# Patient Record
Sex: MALE | Race: OTHER
[De-identification: names, ages, dates, MRNs, and addresses within clinical notes are randomized per-mention and may not be internally consistent; named-entity substitution may affect disease eponyms.]

---

## 2017-08-08 ENCOUNTER — HOSPITAL ENCOUNTER (OUTPATIENT)
Dept: HOSPITAL 72 - PAN | Age: 77
Discharge: HOME | End: 2017-08-08
Payer: MEDICARE

## 2017-08-08 VITALS — SYSTOLIC BLOOD PRESSURE: 112 MMHG | DIASTOLIC BLOOD PRESSURE: 58 MMHG

## 2017-08-08 DIAGNOSIS — K63.5: ICD-10-CM

## 2017-08-08 DIAGNOSIS — K21.9: ICD-10-CM

## 2017-08-08 DIAGNOSIS — K59.00: ICD-10-CM

## 2017-08-08 DIAGNOSIS — K29.70: Primary | ICD-10-CM

## 2017-08-08 NOTE — GI PROGRESS NOTE
Assessment/Plan


Problems:  


(1) Gastritis


ICD Codes:  K29.70 - Gastritis, unspecified, without bleeding


SNOMED:  7095690


(2) Constipation


ICD Codes:  K59.00 - Constipation, unspecified


SNOMED:  84850483


(3) Colon polyp


ICD Codes:  K63.5 - Colon polyp


SNOMED:  86392070


(4) GERD (gastroesophageal reflux disease)


ICD Codes:  K21.9 - GERD (gastroesophageal reflux disease)


SNOMED:  217482907


Status:  stable


Status Narrative


Seen with Dr. Chow.


Assessment/Plan


recommended Align


rx Miralax


RTC x 3 months





Subjective


Subjective


gas


bloating


constipation


no wt loss


no BRBPR


GERD





Objective


T 97.9


/58


P 78


General Appearance:  no apparent distress, alert


Cardiovascular:  normal rate


Respiratory/Chest:  normal breath sounds, no respiratory distress


Abdominal Exam:  normal bowel sounds, non tender, soft


Extremities:  normal range of motion











Whitney Sargent N.P. Aug 8, 2017 11:30

## 2019-06-18 ENCOUNTER — HOSPITAL ENCOUNTER (OUTPATIENT)
Dept: HOSPITAL 72 - PAN | Age: 79
Discharge: HOME | End: 2019-06-18
Payer: MEDICARE

## 2019-06-18 VITALS — SYSTOLIC BLOOD PRESSURE: 131 MMHG | DIASTOLIC BLOOD PRESSURE: 76 MMHG

## 2019-06-18 DIAGNOSIS — K21.9: Primary | ICD-10-CM

## 2019-06-18 DIAGNOSIS — C67.9: ICD-10-CM

## 2019-06-18 DIAGNOSIS — Z86.718: ICD-10-CM

## 2019-06-18 DIAGNOSIS — K59.00: ICD-10-CM

## 2019-06-18 DIAGNOSIS — C49.9: ICD-10-CM

## 2019-06-18 DIAGNOSIS — K63.5: ICD-10-CM

## 2019-06-18 DIAGNOSIS — K29.70: ICD-10-CM

## 2019-06-18 PROCEDURE — 99212 OFFICE O/P EST SF 10 MIN: CPT

## 2019-06-20 NOTE — GENERAL PROGRESS NOTE
Assessment/Plan


Problem List:  


(1) GERD (gastroesophageal reflux disease)


ICD Codes:  K21.9 - GERD (gastroesophageal reflux disease)


SNOMED:  271450271


(2) Gastritis


ICD Codes:  K29.70 - Gastritis, unspecified, without bleeding


SNOMED:  9829450


(3) Colon polyp


ICD Codes:  K63.5 - Colon polyp


SNOMED:  00363237


(4) Constipation


ICD Codes:  K59.00 - Constipation, unspecified


SNOMED:  36264543


(5) H/O deep venous thrombosis


ICD Codes:  Z86.718 - H/O deep venous thrombosis


SNOMED:  971732884


(6) Bladder cancer


ICD Codes:  C67.9 - Bladder cancer


SNOMED:  622938119


(7) Sarcoma


ICD Codes:  C49.9 - Sarcoma


SNOMED:  500903066


Assessment/Plan:


trial of linzess


plan colonoscopy next year





Subjective


ROS Limited/Unobtainable:  Yes


Allergies:  


Coded Allergies:  


     No Known Allergies (Unverified , 7/16/12)





Objective


General Appearance:  alert


EENT:  normal ENT inspection


Neck:  supple


Cardiovascular:  normal rate


Respiratory/Chest:  decreased breath sounds


Abdomen:  normal bowel sounds, non tender, soft


Extremities:  non-tender











Jerardo Chow MD Jun 20, 2019 13:30

## 2020-09-21 ENCOUNTER — HOSPITAL ENCOUNTER (OUTPATIENT)
Dept: HOSPITAL 72 - PAN | Age: 80
Discharge: HOME | End: 2020-09-21
Payer: MEDICARE

## 2020-09-21 DIAGNOSIS — K21.9: Primary | ICD-10-CM

## 2020-09-21 DIAGNOSIS — K29.70: ICD-10-CM

## 2020-09-21 DIAGNOSIS — C67.9: ICD-10-CM

## 2020-09-21 DIAGNOSIS — K63.5: ICD-10-CM

## 2020-09-21 DIAGNOSIS — K59.00: ICD-10-CM

## 2020-09-21 DIAGNOSIS — Z86.718: ICD-10-CM

## 2020-09-21 DIAGNOSIS — C49.9: ICD-10-CM

## 2023-02-11 NOTE — GENERAL PROGRESS NOTE
Subjective


ROS Limited/Unobtainable:  No


Allergies:  


Coded Allergies:  


     No Known Allergies (Unverified , 7/16/12)





Objective


General Appearance:  alert


EENT:  PERRL/EOMI


Neck:  supple


Cardiovascular:  normal peripheral pulses


Respiratory/Chest:  decreased breath sounds


Abdomen:  normal bowel sounds, non tender, soft


Extremities:  non-tender





Assessment/Plan


Assessment/Plan:


Assessment/Plan


Problem List:  


(1) GERD (gastroesophageal reflux disease)


ICD Codes:  K21.9 - GERD (gastroesophageal reflux disease)


SNOMED:  927861671


(2) Gastritis


ICD Codes:  K29.70 - Gastritis, unspecified, without bleeding


SNOMED:  4803584


(3) Colon polyp


ICD Codes:  K63.5 - Colon polyp


SNOMED:  51114567


(4) Constipation


ICD Codes:  K59.00 - Constipation, unspecified


SNOMED:  52869500


(5) H/O deep venous thrombosis


ICD Codes:  Z86.718 - H/O deep venous thrombosis


SNOMED:  148908570


(6) Bladder cancer


ICD Codes:  C67.9 - Bladder cancer


SNOMED:  121575102


(7) Sarcoma


ICD Codes:  C49.9 - Sarcoma


SNOMED:  431768646


Assessment/Plan:


trial of omeprazole


cont Senna


plan colonoscopy next year











Jerardo Chow MD             Sep 21, 2020 14:29
no